# Patient Record
Sex: FEMALE | Race: WHITE | NOT HISPANIC OR LATINO | URBAN - NONMETROPOLITAN AREA
[De-identification: names, ages, dates, MRNs, and addresses within clinical notes are randomized per-mention and may not be internally consistent; named-entity substitution may affect disease eponyms.]

---

## 2021-10-06 ENCOUNTER — WEB ENCOUNTER (OUTPATIENT)
Dept: URBAN - NONMETROPOLITAN AREA CLINIC 2 | Facility: CLINIC | Age: 21
End: 2021-10-06

## 2021-10-06 ENCOUNTER — OFFICE VISIT (OUTPATIENT)
Dept: URBAN - NONMETROPOLITAN AREA CLINIC 2 | Facility: CLINIC | Age: 21
End: 2021-10-06
Payer: COMMERCIAL

## 2021-10-06 VITALS
SYSTOLIC BLOOD PRESSURE: 119 MMHG | HEIGHT: 61 IN | WEIGHT: 143 LBS | TEMPERATURE: 97.5 F | BODY MASS INDEX: 27 KG/M2 | HEART RATE: 87 BPM | DIASTOLIC BLOOD PRESSURE: 81 MMHG

## 2021-10-06 DIAGNOSIS — K30 INDIGESTION: ICD-10-CM

## 2021-10-06 DIAGNOSIS — R10.84 GENERALIZED ABDOMINAL PAIN: ICD-10-CM

## 2021-10-06 DIAGNOSIS — R19.5 LOOSE STOOLS: ICD-10-CM

## 2021-10-06 PROBLEM — 162031009: Status: ACTIVE | Noted: 2021-10-06

## 2021-10-06 PROCEDURE — 99204 OFFICE O/P NEW MOD 45 MIN: CPT | Performed by: NURSE PRACTITIONER

## 2021-10-06 RX ORDER — DICYCLOMINE HYDROCHLORIDE 10 MG/1
1 CAPSULES CAPSULE ORAL THREE TIMES A DAY
Qty: 90 CAPSULE | Refills: 3 | OUTPATIENT
Start: 2021-10-06 | End: 2022-02-02

## 2021-10-06 NOTE — HPI-TODAY'S VISIT:
Fauzia is a 21-year-old female who presents to discuss issues with indigestion, loose stools and abdominal pain.  She states for the last couple of months around 6 months, she has been having indigestion postprandial about 5 out of the 7 days as well as some midline abdominal pain in the morning that is relieved with a BM and some more loose than normal stools.  No blood.  Minimal urgency if any.  No nocturnal complaints.  She reports that her brother was recently seen here and diagnosed with H. pylori and he was having similar complaints that resolved with treatment. Sb

## 2021-10-08 LAB
A/G RATIO: 1.9
ALBUMIN: 4.7
ALKALINE PHOSPHATASE: 44
ALT (SGPT): 12
AST (SGOT): 14
BASO (ABSOLUTE): 0
BASOS: 1
BILIRUBIN, TOTAL: 0.3
BUN/CREATININE RATIO: 15
BUN: 10
C-REACTIVE PROTEIN, QUANT: 3
CALCIUM: 9.5
CARBON DIOXIDE, TOTAL: 24
CHLORIDE: 102
CREATININE: 0.67
DEAMIDATED GLIADIN ABS, IGA: 3
DEAMIDATED GLIADIN ABS, IGG: 1
EGFR IF AFRICN AM: 143
EGFR IF NONAFRICN AM: 124
ENDOMYSIAL ANTIBODY IGA: NEGATIVE
EOS (ABSOLUTE): 0
EOS: 1
GLOBULIN, TOTAL: 2.5
GLUCOSE: 80
H PYLORI BREATH TEST: NEGATIVE
HEMATOCRIT: 43.9
HEMATOLOGY COMMENTS:: (no result)
HEMOGLOBIN: 14.5
IMMATURE CELLS: (no result)
IMMATURE GRANS (ABS): 0
IMMATURE GRANULOCYTES: 0
IMMUNOGLOBULIN A, QN, SERUM: 104
LYMPHS (ABSOLUTE): 1.5
LYMPHS: 36
MCH: 30.3
MCHC: 33
MCV: 92
MONOCYTES(ABSOLUTE): 0.3
MONOCYTES: 6
NEUTROPHILS (ABSOLUTE): 2.3
NEUTROPHILS: 56
NRBC: (no result)
PLATELETS: 237
POTASSIUM: 3.9
PROTEIN, TOTAL: 7.2
RBC: 4.79
RDW: 12.5
SEDIMENTATION RATE-WESTERGREN: 2
SODIUM: 139
T-TRANSGLUTAMINASE (TTG) IGA: <2
T-TRANSGLUTAMINASE (TTG) IGG: <2
T4,FREE(DIRECT): 1.53
TSH: 1.47
WBC: 4.1

## 2021-10-11 ENCOUNTER — WEB ENCOUNTER (OUTPATIENT)
Dept: URBAN - METROPOLITAN AREA CLINIC 92 | Facility: CLINIC | Age: 21
End: 2021-10-11

## 2021-12-22 ENCOUNTER — OFFICE VISIT (OUTPATIENT)
Dept: URBAN - NONMETROPOLITAN AREA CLINIC 2 | Facility: CLINIC | Age: 21
End: 2021-12-22

## 2022-01-07 ENCOUNTER — OFFICE VISIT (OUTPATIENT)
Dept: URBAN - NONMETROPOLITAN AREA CLINIC 2 | Facility: CLINIC | Age: 22
End: 2022-01-07

## 2023-01-23 ENCOUNTER — OFFICE VISIT (OUTPATIENT)
Dept: URBAN - METROPOLITAN AREA CLINIC 54 | Facility: CLINIC | Age: 23
End: 2023-01-23
Payer: COMMERCIAL

## 2023-01-23 ENCOUNTER — DASHBOARD ENCOUNTERS (OUTPATIENT)
Age: 23
End: 2023-01-23

## 2023-01-23 VITALS
SYSTOLIC BLOOD PRESSURE: 121 MMHG | HEART RATE: 83 BPM | HEIGHT: 61 IN | DIASTOLIC BLOOD PRESSURE: 77 MMHG | TEMPERATURE: 97.1 F | BODY MASS INDEX: 28.7 KG/M2 | WEIGHT: 152 LBS

## 2023-01-23 DIAGNOSIS — K64.4 SKIN TAGS, ANUS OR RECTUM: ICD-10-CM

## 2023-01-23 DIAGNOSIS — K60.2 ANAL FISSURE: ICD-10-CM

## 2023-01-23 DIAGNOSIS — L91.8 SKIN TAGS, MULTIPLE ACQUIRED: ICD-10-CM

## 2023-01-23 PROCEDURE — 99213 OFFICE O/P EST LOW 20 MIN: CPT | Performed by: INTERNAL MEDICINE

## 2023-01-23 NOTE — HPI-TODAY'S VISIT:
Fauzia is a 21-year-old female who presents to discuss issues with indigestion, loose stools and abdominal pain.  She states for the last couple of months around 6 months, she has been having indigestion postprandial about 5 out of the 7 days as well as some midline abdominal pain in the morning that is relieved with a BM and some more loose than normal stools.  No blood.  Minimal urgency if any.  No nocturnal complaints.  She reports that her brother was recently seen here and diagnosed with H. pylori and he was having similar complaints that resolved with treatment. Sb  1-23-23 Pt reports that she has hemorrhoidal trouble with worsening symptoms.  Pt reports that she has been trying to treat this.  Pt reports that she has taken stool softeners daily which has not helped.  Pt reports that she tried th amanda. Nothing is helping. Eveyr time uses the restroom